# Patient Record
Sex: FEMALE | Race: OTHER | HISPANIC OR LATINO | Employment: UNEMPLOYED | URBAN - METROPOLITAN AREA
[De-identification: names, ages, dates, MRNs, and addresses within clinical notes are randomized per-mention and may not be internally consistent; named-entity substitution may affect disease eponyms.]

---

## 2018-06-17 ENCOUNTER — APPOINTMENT (EMERGENCY)
Dept: RADIOLOGY | Facility: HOSPITAL | Age: 63
End: 2018-06-17
Payer: COMMERCIAL

## 2018-06-17 ENCOUNTER — HOSPITAL ENCOUNTER (EMERGENCY)
Facility: HOSPITAL | Age: 63
Discharge: HOME | End: 2018-06-17
Attending: EMERGENCY MEDICINE | Admitting: EMERGENCY MEDICINE
Payer: COMMERCIAL

## 2018-06-17 VITALS
TEMPERATURE: 98.1 F | HEART RATE: 127 BPM | OXYGEN SATURATION: 100 % | RESPIRATION RATE: 19 BRPM | DIASTOLIC BLOOD PRESSURE: 82 MMHG | SYSTOLIC BLOOD PRESSURE: 158 MMHG

## 2018-06-17 DIAGNOSIS — J30.9 ALLERGIC RHINITIS: ICD-10-CM

## 2018-06-17 DIAGNOSIS — R06.02 SHORTNESS OF BREATH: Primary | ICD-10-CM
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
## 2018-06-17 DIAGNOSIS — S80.11XA CONTUSION OF MULTIPLE SITES OF RIGHT LOWER EXTREMITY, INITIAL ENCOUNTER: ICD-10-CM

## 2018-06-17 LAB
ANION GAP SERPL CALCULATED.3IONS-SCNC: 10 MMOL/L (ref 4–13)
BASOPHILS # BLD AUTO: 0.05 THOUSANDS/ΜL (ref 0–0.1)
BASOPHILS NFR BLD AUTO: 0 % (ref 0–1)
BUN SERPL-MCNC: 38 MG/DL (ref 5–25)
CALCIUM SERPL-MCNC: 9 MG/DL (ref 8.3–10.1)
CHLORIDE SERPL-SCNC: 105 MMOL/L (ref 100–108)
CO2 SERPL-SCNC: 26 MMOL/L (ref 21–32)
CREAT SERPL-MCNC: 1.83 MG/DL (ref 0.6–1.3)
EOSINOPHIL # BLD AUTO: 0.05 THOUSAND/ΜL (ref 0–0.61)
EOSINOPHIL NFR BLD AUTO: 0 % (ref 0–6)
ERYTHROCYTE [DISTWIDTH] IN BLOOD BY AUTOMATED COUNT: 14.5 % (ref 11.6–15.1)
GFR SERPL CREATININE-BSD FRML MDRD: 29 ML/MIN/1.73SQ M
GLUCOSE SERPL-MCNC: 221 MG/DL (ref 65–140)
HCT VFR BLD AUTO: 40 % (ref 34.8–46.1)
HGB BLD-MCNC: 12.8 G/DL (ref 11.5–15.4)
IMM GRANULOCYTES # BLD AUTO: 0.05 THOUSAND/UL (ref 0–0.2)
IMM GRANULOCYTES NFR BLD AUTO: 0 % (ref 0–2)
LYMPHOCYTES # BLD AUTO: 1.58 THOUSANDS/ΜL (ref 0.6–4.47)
LYMPHOCYTES NFR BLD AUTO: 11 % (ref 14–44)
MCH RBC QN AUTO: 26.7 PG (ref 26.8–34.3)
MCHC RBC AUTO-ENTMCNC: 32 G/DL (ref 31.4–37.4)
MCV RBC AUTO: 84 FL (ref 82–98)
MONOCYTES # BLD AUTO: 0.96 THOUSAND/ΜL (ref 0.17–1.22)
MONOCYTES NFR BLD AUTO: 6 % (ref 4–12)
NEUTROPHILS # BLD AUTO: 12.27 THOUSANDS/ΜL (ref 1.85–7.62)
NEUTS SEG NFR BLD AUTO: 83 % (ref 43–75)
NRBC BLD AUTO-RTO: 0 /100 WBCS
PLATELET # BLD AUTO: 278 THOUSANDS/UL (ref 149–390)
PMV BLD AUTO: 11.4 FL (ref 8.9–12.7)
POTASSIUM SERPL-SCNC: 2.9 MMOL/L (ref 3.5–5.3)
RBC # BLD AUTO: 4.79 MILLION/UL (ref 3.81–5.12)
SODIUM SERPL-SCNC: 141 MMOL/L (ref 136–145)
TROPONIN I SERPL-MCNC: <0.02 NG/ML
WBC # BLD AUTO: 14.96 THOUSAND/UL (ref 4.31–10.16)

## 2018-06-17 PROCEDURE — 99285 EMERGENCY DEPT VISIT HI MDM: CPT

## 2018-06-17 PROCEDURE — 96361 HYDRATE IV INFUSION ADD-ON: CPT

## 2018-06-17 PROCEDURE — 36415 COLL VENOUS BLD VENIPUNCTURE: CPT | Performed by: EMERGENCY MEDICINE

## 2018-06-17 PROCEDURE — 85025 COMPLETE CBC W/AUTO DIFF WBC: CPT | Performed by: EMERGENCY MEDICINE

## 2018-06-17 PROCEDURE — 93005 ELECTROCARDIOGRAM TRACING: CPT

## 2018-06-17 PROCEDURE — 84484 ASSAY OF TROPONIN QUANT: CPT | Performed by: EMERGENCY MEDICINE

## 2018-06-17 PROCEDURE — 71046 X-RAY EXAM CHEST 2 VIEWS: CPT

## 2018-06-17 PROCEDURE — 90471 IMMUNIZATION ADMIN: CPT

## 2018-06-17 PROCEDURE — 96374 THER/PROPH/DIAG INJ IV PUSH: CPT

## 2018-06-17 PROCEDURE — 80048 BASIC METABOLIC PNL TOTAL CA: CPT | Performed by: EMERGENCY MEDICINE

## 2018-06-17 PROCEDURE — 73590 X-RAY EXAM OF LOWER LEG: CPT

## 2018-06-17 PROCEDURE — 90715 TDAP VACCINE 7 YRS/> IM: CPT | Performed by: EMERGENCY MEDICINE

## 2018-06-17 RX ORDER — FLUTICASONE PROPIONATE 50 MCG
1 SPRAY, SUSPENSION (ML) NASAL DAILY
Qty: 16 G | Refills: 0 | Status: SHIPPED | OUTPATIENT
Start: 2018-06-17

## 2018-06-17 RX ORDER — POTASSIUM CHLORIDE 20 MEQ/1
40 TABLET, EXTENDED RELEASE ORAL ONCE
Status: COMPLETED | OUTPATIENT
Start: 2018-06-17 | End: 2018-06-17

## 2018-06-17 RX ORDER — ACETAMINOPHEN 325 MG/1
975 TABLET ORAL ONCE
Status: COMPLETED | OUTPATIENT
Start: 2018-06-17 | End: 2018-06-17

## 2018-06-17 RX ORDER — LORAZEPAM 2 MG/ML
0.5 INJECTION INTRAMUSCULAR ONCE
Status: COMPLETED | OUTPATIENT
Start: 2018-06-17 | End: 2018-06-17

## 2018-06-17 RX ORDER — SENNOSIDES 8.6 MG
650 CAPSULE ORAL EVERY 8 HOURS PRN
Qty: 15 TABLET | Refills: 0 | Status: SHIPPED | OUTPATIENT
Start: 2018-06-17 | End: 2018-06-22

## 2018-06-17 RX ADMIN — LORAZEPAM 0.5 MG: 2 INJECTION INTRAMUSCULAR; INTRAVENOUS at 21:29

## 2018-06-17 RX ADMIN — ACETAMINOPHEN 975 MG: 325 TABLET ORAL at 22:28

## 2018-06-17 RX ADMIN — TETANUS TOXOID, REDUCED DIPHTHERIA TOXOID AND ACELLULAR PERTUSSIS VACCINE, ADSORBED 0.5 ML: 5; 2.5; 8; 8; 2.5 SUSPENSION INTRAMUSCULAR at 22:11

## 2018-06-17 RX ADMIN — POTASSIUM CHLORIDE 40 MEQ: 1500 TABLET, EXTENDED RELEASE ORAL at 22:28

## 2018-06-17 RX ADMIN — SODIUM CHLORIDE 1000 ML: 0.9 INJECTION, SOLUTION INTRAVENOUS at 22:10

## 2018-06-18 LAB
ATRIAL RATE: 115 BPM
P AXIS: 119 DEGREES
PR INTERVAL: 152 MS
QRS AXIS: 149 DEGREES
QRSD INTERVAL: 72 MS
QT INTERVAL: 332 MS
QTC INTERVAL: 459 MS
T WAVE AXIS: 138 DEGREES
VENTRICULAR RATE: 115 BPM

## 2018-06-18 PROCEDURE — 93010 ELECTROCARDIOGRAM REPORT: CPT | Performed by: INTERNAL MEDICINE
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
## 2018-06-18 NOTE — ED NOTES
Patient to be discharged  Patient awaiting discharge instructions at this time        Kadi Vazquez RN  06/17/18 9828

## 2018-06-18 NOTE — DISCHARGE INSTRUCTIONS
Falta de aliento   CUIDADO AMBULATORIO:   Dificultad para respirar  La falta de aliento es la sensación de que no puede obtener suficiente aire cuando respira  Usted puede tener esta sensación solo sherman la [place], o todo el [place]  Los síntomas pueden variar de leves a severos  La falta de [place] puede ser un signo de tiana condición de adin grave que requiere atención inmediata  Busque atención médica de inmediato si:   · Jenna signos y síntomas están igual o empeoran en las siguientes 24 horas del tratamiento  · La piel sobre jenna costillas o en garcia aleksandr se hunden cuando usted respira  · Usted se siente confundido o mareado  Pregúntele a garcia médico qué vitaminas y minerales son adecuados para usted  · Usted tiene nuevos síntomas o jenna síntomas empeoran  · Usted tiene preguntas o inquietudes acerca de garcia condición o cuidado  Tratamiento:   · Medicamentos,  Los [employer] se pueden usar para encontrar la causa de los síntomas  Usted podría necesitar medicamentos para tratar tiana infección bacteriana o para reducir la ansiedad  Otros medicamentos pueden utilizarse para abrir las vías respiratorias, desinflamar o quitar líquido extra  Si usted tiene tiana condición del corazón, puede necesitar medicamentos para ayudar a que garcia corazón palpite más ken o regularmente  · Oxígeno  Se le podría administrar oxígeno para ayudarlo a respirar mejor  Maneje la falta de aliento:   · [place] un plan de acción  Usted y garcia médico pueden trabajar juntos a fin de crear un plan para manejar la falta de [place]  El plan puede incluir las actividades diarias, cambios de tratamiento y qué hacer si usted tiene problemas respiratorios graves  · Al mindi asiento inclínese hacia adelante en jenna codos  [place] ayuda a [employer] y puede que le sea más fácil respirar  · Use la respiración con los labios fruncidos cada vez que se sienta corto de respiración    Respire por la nariz y muy despacio exhale por garcia boca con los labios ligeramente fruncidos o en forma de ''U''  Se debería demorar el doble de tiempo al expulsar el aire de lo que le harjit en inhalarlo  · No fume  La nicotina y otras sustancias químicas que contienen los cigarrillos y puros pueden causar daño pulmonar y empeorar la falta de [place]  Pida información a garcia médico si usted actualmente fuma y necesita ayuda para dejar de fumar  Los cigarrillos electrónicos o tabaco sin humo todavía contienen nicotina  Consulte con garcia médico antes de [employer]  · Alcance o mantenga un peso saludable  Garcia médico le puede ayudar a elaborar un plan para perder peso de tiana forma geller si usted tiene sobrepeso  · Ejercítese según indicaciones  El ejercicio puede ayudar a los pulmones a trabajar más fácilmente  El ejercicio también puede ayudar a perder peso, si es necesario  Trate de hacer unos 30 minutos de ejercicio la mayoría de los días de la [place]  Jose Maria tiana tru de control con garcia médico o especialista lidia se lo indicaron:  Anote sabrina preguntas para que se acuerde de hacerlas sherman sabrina visitas  © 2017 [employer] Information is for End User's use only and may not be sold, redistributed or otherwise used for commercial purposes  All illustrations and images included in CareNotes® are the copyrighted property of A D A M , Inc  or Alex Nicole  Esta información es sólo para uso en educación  Garcia intención no es darle un consejo médico sobre enfermedades o tratamientos  Colsulte con garcia Naren Ahle farmacéutico antes de seguir cualquier régimen médico para saber si es seguro y efectivo para usted

## 2019-10-16 ENCOUNTER — HOSPITAL ENCOUNTER (EMERGENCY)
Facility: HOSPITAL | Age: 64
Discharge: HOME | End: 2019-10-16
Attending: EMERGENCY MEDICINE
Payer: COMMERCIAL

## 2019-10-16 VITALS
OXYGEN SATURATION: 96 % | RESPIRATION RATE: 14 BRPM | DIASTOLIC BLOOD PRESSURE: 80 MMHG | WEIGHT: 160 LBS | TEMPERATURE: 98.6 F | HEART RATE: 80 BPM | SYSTOLIC BLOOD PRESSURE: 148 MMHG

## 2019-10-16 DIAGNOSIS — K29.60 REFLUX GASTRITIS: ICD-10-CM

## 2019-10-16 DIAGNOSIS — I10 HIGH BLOOD PRESSURE: Primary | ICD-10-CM

## 2019-10-16 LAB
ATRIAL RATE: 93 BPM
P AXIS: 48 DEGREES
PR INTERVAL: 152 MS
QRS AXIS: 8 DEGREES
QRSD INTERVAL: 70 MS
QT INTERVAL: 360 MS
QTC INTERVAL: 447 MS
T WAVE AXIS: 20 DEGREES
VENTRICULAR RATE: 93 BPM

## 2019-10-16 PROCEDURE — 93005 ELECTROCARDIOGRAM TRACING: CPT

## 2019-10-16 PROCEDURE — 99284 EMERGENCY DEPT VISIT MOD MDM: CPT | Performed by: EMERGENCY MEDICINE

## 2019-10-16 PROCEDURE — 99283 EMERGENCY DEPT VISIT LOW MDM: CPT

## 2019-10-16 PROCEDURE — 93010 ELECTROCARDIOGRAM REPORT: CPT | Performed by: INTERNAL MEDICINE

## 2019-10-16 RX ORDER — LIDOCAINE HYDROCHLORIDE 20 MG/ML
15 SOLUTION OROPHARYNGEAL ONCE
Status: COMPLETED | OUTPATIENT
Start: 2019-10-16 | End: 2019-10-16

## 2019-10-16 RX ORDER — MAGNESIUM HYDROXIDE/ALUMINUM HYDROXICE/SIMETHICONE 120; 1200; 1200 MG/30ML; MG/30ML; MG/30ML
30 SUSPENSION ORAL ONCE
Status: COMPLETED | OUTPATIENT
Start: 2019-10-16 | End: 2019-10-16

## 2019-10-16 RX ORDER — LIDOCAINE HYDROCHLORIDE 20 MG/ML
15 SOLUTION OROPHARYNGEAL 4 TIMES DAILY PRN
Qty: 30 ML | Refills: 0 | Status: SHIPPED | OUTPATIENT
Start: 2019-10-16

## 2019-10-16 RX ORDER — SUCRALFATE ORAL 1 G/10ML
1 SUSPENSION ORAL 4 TIMES DAILY
Qty: 420 ML | Refills: 0 | Status: SHIPPED | OUTPATIENT
Start: 2019-10-16

## 2019-10-16 RX ADMIN — ALUMINUM HYDROXIDE, MAGNESIUM HYDROXIDE, AND SIMETHICONE 30 ML: 200; 200; 20 SUSPENSION ORAL at 15:45

## 2019-10-16 RX ADMIN — LIDOCAINE HYDROCHLORIDE 15 ML: 20 SOLUTION ORAL; TOPICAL at 15:45

## 2019-10-16 NOTE — ED ATTENDING ATTESTATION
10/16/2019  I, Rosibel Edouard MD, saw and evaluated the patient  I have discussed the patient with the resident/non-physician practitioner and agree with the resident's/non-physician practitioner's findings, Plan of Care, and MDM as documented in the resident's/non-physician practitioner's note, except where noted  All available labs and Radiology studies were reviewed  I was present for key portions of any procedure(s) performed by the resident/non-physician practitioner and I was immediately available to provide assistance  At this point I agree with the current assessment done in the Emergency Department  I have conducted an independent evaluation of this patient a history and physical is as follows:    ED Course         Critical Care Time  Procedures     61 yo female c/o increased bp with hx of htn, compliant with her meds  Pt states bp was 150's at home when checking  Pt states her gerd discomfort is worse  No cp, no sob, no abdominal pain, no headache, no numbness, tingling, weakness  No vertigo  No numbness, tingling, weakness  Vss, afebrile, lungs cta, rrr, abdomen soft nontender, no neuro deficits  Normal fundoscopic exam  Ekg  Gi cocktail  Follow up with pcp

## 2019-10-16 NOTE — ED PROVIDER NOTES
History  Chief Complaint   Patient presents with    High Blood Pressure     Pt reports coming in today for high bp  Pt reports burning in arm and has "gastritis"     81-year-old woman with a past medical history of diabetes mellitus, hypertension presents for evaluation blood pressure  Patient states she recently got a home blood pressure cuff  She checked her blood pressure today and it was 156/96  She is concerned her blood pressure is too high want to be evaluated today  She takes irbesartan, HCTZ and metoprolol daily  Metoprolol dose was recently increased from 25 mg daily to 50 mg this change occurred within the past week  She has not was change of blood pressure after changing the dose  She denies headache, change in vision numbness, weakness, tingling in her extremities  She denies fever, chills, nausea vomiting, diarrhea  She does complain epigastric burning pain  She has a history of GERD and takes Prilosec and Carafate  She ran of Carafate and has not had relief of symptoms with prosthetic alone  The pain is not different from her normal reflux pain  Prior to Admission Medications   Prescriptions Last Dose Informant Patient Reported? Taking? GLIPIZIDE PO  Self Yes No   Sig: Take by mouth Unknown frequency or dosage per patient  PIOGLITAZONE HCL PO   Yes No   Sig: Take by mouth Unknown dosage or frequency   SITagliptin Phosphate (JANUVIA PO)  Self Yes No   Sig: Take by mouth Unknown  Dosage or frequency   fluticasone (FLONASE) 50 mcg/act nasal spray   No No   Si spray into each nostril daily      Facility-Administered Medications: None       Past Medical History:   Diagnosis Date    Diabetes mellitus ([facility] [address] )     Hypertension        History reviewed  No pertinent surgical history  History reviewed  No pertinent family history  I have reviewed and agree with the history as documented      Social History     Tobacco Use    Smoking status: Never Smoker    Smokeless tobacco: Never Used   Substance Use Topics    Alcohol use: No    Drug use: No        Review of Systems   Constitutional: Negative for appetite change, chills, diaphoresis, fatigue and fever  HENT: Negative for congestion, rhinorrhea and sore throat  Respiratory: Negative for cough, shortness of breath, wheezing and stridor  Cardiovascular: Negative for chest pain, palpitations and leg swelling  Gastrointestinal: Positive for abdominal pain (Epigastric)  Negative for abdominal distention, constipation, diarrhea, nausea and vomiting  Endocrine: Negative for polydipsia and polyuria  Genitourinary: Negative for dysuria and hematuria  Musculoskeletal: Negative for back pain, neck pain and neck stiffness  Skin: Negative for pallor, rash and wound  Neurological: Negative for dizziness, light-headedness and headaches  Psychiatric/Behavioral: Negative for behavioral problems and confusion  All other systems reviewed and are negative  Physical Exam  ED Triage Vitals [10/16/19 1433]   Temperature Pulse Respirations Blood Pressure SpO2   98 6 °F (37 °C) 96 18 (!) 170/104 99 %      Temp Source Heart Rate Source Patient Position - Orthostatic VS BP Location FiO2 (%)   Oral Monitor Sitting Right arm --      Pain Score       No Pain             Orthostatic Vital Signs  Vitals:    10/16/19 1433 10/16/19 1626 10/16/19 1800 10/16/19 1812   BP: (!) 170/104 (!) 180/89 149/80 148/80   Pulse: 96 86 80    Patient Position - Orthostatic VS: Sitting Lying         Physical Exam   Constitutional: She is oriented to person, place, and time  She appears well-developed and well-nourished  No distress  HENT:   Head: Normocephalic and atraumatic  Eyes: Conjunctivae are normal  No scleral icterus  Fundoscopic exam:       The right eye shows no papilledema  The left eye shows no papilledema  Short borders of optic disc bilaterally on funduscopic exam   Neck: Normal range of motion  Neck supple     Cardiovascular: Normal rate, regular rhythm, normal heart sounds and intact distal pulses  No murmur heard  Pulmonary/Chest: Effort normal and breath sounds normal  No respiratory distress  She has no wheezes  Abdominal: Soft  Bowel sounds are normal  She exhibits no distension and no mass  There is no tenderness (Epigastric)  There is no rebound and no guarding  Musculoskeletal: Normal range of motion  She exhibits no edema, tenderness or deformity  Neurological: She is alert and oriented to person, place, and time  Skin: Skin is warm and dry  No rash noted  She is not diaphoretic  No erythema  No pallor  Psychiatric: She has a normal mood and affect  Her behavior is normal    Nursing note and vitals reviewed        ED Medications  Medications   aluminum-magnesium hydroxide-simethicone (MYLANTA) 200-200-20 mg/5 mL oral suspension 30 mL (30 mL Oral Given 10/16/19 3016)   Lidocaine Viscous HCl (XYLOCAINE) 2 % mucosal solution 15 mL (15 mL Swish & Spit Given 10/16/19 6096)       Diagnostic Studies  Results Reviewed     None                 No orders to display         Procedures  ECG 12 Lead Documentation Only  Date/Time: 10/16/2019 5:55 PM  Performed by: Mercedes Andrade MD  Authorized by: Mercedes Andrade MD     Indications / Diagnosis:  Hypertension  ECG reviewed by me, the ED Provider: yes    Patient location:  ED  Previous ECG:     Previous ECG:  Compared to current    Similarity:  Changes noted  Rate:     ECG rate:  93    ECG rate assessment: normal    Rhythm:     Rhythm: sinus rhythm    Ectopy:     Ectopy: none    QRS:     QRS axis:  Normal    QRS intervals:  Normal  Conduction:     Conduction: normal    T waves:     T waves: inverted      Inverted:  II            ED Course  ED Course as of Oct 18 2309   Wed Oct 16, 2019   1811 Blood Pressure(!): 180/89                               MDM  Number of Diagnoses or Management Options  High blood pressure: new and requires workup  Reflux gastritis: new and requires workup  Diagnosis management comments: [de-identified] old woman presents over concern for elevated blood pressure as well as exacerbation acid reflux  Patient has mild epigastric tenderness on exam   Physical exam otherwise benign  Blood pressure mildly elevated but not concerning  Check EKG and give Mylanta and viscous lidocaine  Reassess  Next EKG normal there is nonspecific T-wave inversion in lead to  Otherwise no changes  Will discharge home with refill for Carafate and 2 doses of this is lidocaine her gastritis  Follow up with PCP for hypertension       Amount and/or Complexity of Data Reviewed  Clinical lab tests: ordered and reviewed  Decide to obtain previous medical records or to obtain history from someone other than the patient: yes  Obtain history from someone other than the patient: yes  Review and summarize past medical records: yes  Discuss the patient with other providers: yes  Independent visualization of images, tracings, or specimens: yes    Risk of Complications, Morbidity, and/or Mortality  Presenting problems: minimal  Diagnostic procedures: minimal  Management options: minimal    Patient Progress  Patient progress: improved      Disposition  Final diagnoses:   High blood pressure   Reflux gastritis     Time reflects when diagnosis was documented in both MDM as applicable and the Disposition within this note     Time User Action Codes Description Comment    10/16/2019  5:53 PM Michelle Taylor Add [I10] High blood pressure     10/16/2019  5:53 PM Michlele Taylor Add [K29 60] Reflux gastritis       ED Disposition     ED Disposition Condition Date/Time Comment    Discharge Stable Wed Oct 16, 2019  5:53 PM Sade Morales discharge to home/self care  Follow-up Information     Follow up With Specialties Details Why Contact Info    Dov Cervantes MD    [address] [facility]  [phone]            Discharge Medication List as of 10/16/2019  5:54 PM START taking these medications    Details   Lidocaine Viscous HCl (XYLOCAINE) 2 % mucosal solution Swish and spit 15 mL 4 (four) times a day as needed for mild pain for up to 2 doses, Starting Wed 10/16/2019, Print      sucralfate (CARAFATE) 1 g/10 mL suspension Take 10 mL (1 g total) by mouth 4 (four) times a day, Starting Wed 10/16/2019, Print         CONTINUE these medications which have NOT CHANGED    Details   fluticasone (FLONASE) 50 mcg/act nasal spray 1 spray into each nostril daily, Starting Sun 6/17/2018, Print      GLIPIZIDE PO Take by mouth Unknown frequency or dosage per patient , Historical Med      PIOGLITAZONE HCL PO Take by mouth Unknown dosage or frequency, Historical Med      SITagliptin Phosphate (JANUVIA PO) Take by mouth Unknown  Dosage or frequency, Historical Med           No discharge procedures on file  ED Provider  Attending physically available and evaluated Patrica Cough  I managed the patient along with the ED Attending      Electronically Signed by         Shruthi Cee MD  10/16/19 1210 Carlos Rodriguez MD  10/18/19 2134